# Patient Record
Sex: MALE | Race: WHITE | URBAN - METROPOLITAN AREA
[De-identification: names, ages, dates, MRNs, and addresses within clinical notes are randomized per-mention and may not be internally consistent; named-entity substitution may affect disease eponyms.]

---

## 2021-02-17 ENCOUNTER — HOSPITAL ENCOUNTER (EMERGENCY)
Age: 46
Discharge: HOME | End: 2021-02-17
Payer: COMMERCIAL

## 2021-02-17 ENCOUNTER — APPOINTMENT (OUTPATIENT)
Dept: GENERAL RADIOLOGY | Age: 46
End: 2021-02-17
Payer: COMMERCIAL

## 2021-02-17 VITALS
HEIGHT: 70 IN | SYSTOLIC BLOOD PRESSURE: 120 MMHG | HEART RATE: 76 BPM | OXYGEN SATURATION: 99 % | TEMPERATURE: 98 F | DIASTOLIC BLOOD PRESSURE: 83 MMHG | WEIGHT: 166.45 LBS | BODY MASS INDEX: 23.83 KG/M2 | RESPIRATION RATE: 17 BRPM

## 2021-02-17 DIAGNOSIS — S49.92XA INJURY OF LEFT SHOULDER, INITIAL ENCOUNTER: Primary | ICD-10-CM

## 2021-02-17 PROCEDURE — 99283 EMERGENCY DEPT VISIT LOW MDM: CPT

## 2021-02-17 PROCEDURE — 73030 X-RAY EXAM OF SHOULDER: CPT

## 2021-02-17 ASSESSMENT — PAIN SCALES - GENERAL
PAINLEVEL_OUTOF10: 2
PAINLEVEL_OUTOF10: 1

## 2021-02-17 ASSESSMENT — PAIN DESCRIPTION - PROGRESSION: CLINICAL_PROGRESSION: NOT CHANGED

## 2021-02-17 ASSESSMENT — PAIN DESCRIPTION - ORIENTATION: ORIENTATION: LEFT

## 2021-02-18 NOTE — ED PROVIDER NOTES
[address]  [address] [address]  Dept: [phone]  Loc: [address] ENCOUNTER        This patient was not seen or evaluated by the attending physician. I evaluated this patient, the attending physician was available for consultation. CHIEF COMPLAINT    Chief Complaint   Patient presents with    Shoulder Pain     left shoulder pain from falling in snow. injury occyured 2/16/2021       SHAWN Cano is a 39 y.o. male who presents with pain to the right shoulder. Onset was last night. The duration has been constant since the onset. The context is that he slipped and fell on his shoulder in the snow. The quality of the pain is sharp. The severity is 2/10. The patient has no other associated injury. REVIEW OF SYSTEMS    Musculoskeletal: see HPI, no other joint or bony injury  Cardiac: No chest pain  Pulmonary: No difficulty breathing  Skin: No lacerations or puncture wounds  Neurologic: No loss of consciousness, no head injury, no paresthesias or focal distal extremity weakness    PAST MEDICAL & SURGICAL HISTORY    History reviewed. No pertinent past medical history. History reviewed. No pertinent surgical history.     CURRENT MEDICATIONS  (may include discharge medications prescribed in the ED)  Current Outpatient Rx   Medication Sig Dispense Refill    sucralfate (CARAFATE) 1 GM/10ML suspension Take 10 mLs by mouth 4 times daily 1200 mL 0    ranitidine (ZANTAC) 300 MG tablet Take 1 tablet by mouth nightly 30 tablet 0       ALLERGIES    No Known Allergies    SOCIAL & FAMILY HISTORY    Social History     Socioeconomic History    Marital status:      Spouse name: None    Number of children: None    Years of education: None    Highest education level: None   Occupational History    None   Social Needs    Financial resource strain: None    Food insecurity     Worry: None Inability: None    Transportation needs     Medical: None     Non-medical: None   Tobacco Use    Smoking status: Former Smoker     Quit date: 12/22/2017     Years since quitting: 3.1    Smokeless tobacco: Former User   Substance and Sexual Activity    Alcohol use: No    Drug use: No    Sexual activity: Yes     Partners: Female   Lifestyle    Physical activity     Days per week: None     Minutes per session: None    Stress: None   Relationships    Social connections     Talks on phone: None     Gets together: None     Attends [employer] service: None     Active member of club or organization: None     Attends meetings of clubs or organizations: None     Relationship status: None    Intimate partner violence     Fear of current or ex partner: None     Emotionally abused: None     Physically abused: None     Forced sexual activity: None   Other Topics Concern    None   Social History Narrative    None     History reviewed. No pertinent family history. PHYSICAL EXAM    VITAL SIGNS: /83   Pulse 76   Temp 98 °F (36.7 °C) (Tympanic)   Resp 17   Ht 5' 10\" (1.778 m)   Wt 166 lb 7.2 oz (75.5 kg)   SpO2 99%   BMI 23.88 kg/m²   Constitutional:  Well nourished, no acute distress  HENT:  Atraumatic, moist mucous membranes  NECK: normal range of motion/supple, no posterior midline neck tenderness  Respiratory: Lungs clear to auscultation bilaterally, no retractions  Cardiovascular:  Regular rate, no JVD  Vascular:  radial pulse 2+ on the affected side  Musculoskeletal:  Exam of the affected shoulder reveals mild tenderness to palpation of the [facility] joint, no obvious deformity, passive and active range of motion intact.  No increased warmth or joint effusion    Integument:  Well hydrated, no skin lacerations   Neurologic:  Awake, alert, no slurred speech, axillary nerve is intact and the median/ulnar/radial nerve sensory and motor distributions are intact on the affected side      RADIOLOGY   XR SHOULDER LEFT (MIN 2 VIEWS)   Final Result   Suboptimal visualization of the humeral head due to positioning. No acute   fracture given limitation. No dislocation. Slight inferior position of the acromion relative to the clavicle appears new   from prior exam.  Correlate with presentation for acute acromioclavicular   injury. PROCEDURES  none    ED COURSE & MEDICAL DECISION MAKING   See chart for medications given during emergency department course. Differential diagnosis: includes but not limited to rotator cuff sprain/tear/rupture, clavicle fracture, humerus fracture, scapular fracture, posterior or anterior glenohumeral joint dislocation, glenohumeral subluxation, AC joint separation, brachioplexus injury or other peripheral nerve injury, cervical spine neurologic or mechanical bony injury, left apical pneumothorax, arterial injury/Ischemia, Infection, other    Patient presents with right shoulder injury. See HPI for full presentation. Physical exam as above. Tenderness over the right AC joint. Concern for [facility] separation. X-ray shows no fracture. No neurovascular deficits. Already in sling. Given orthopedic referral and discharged home in stable condition. At this time, the evidence for any other entities in the differential is insufficient to justify any further testing. This was explained to the patient. The patient was advised that persistent or worsening symptoms will require further evaluation. The patient tolerated their visit well. I saw the patient independently with physician available for consultation as needed. The patient and / or the family were informed of the results of any tests, a time was given to answer questions, a plan was proposed and they agreed with plan. FINAL IMPRESSION    1.  Injury of left shoulder, initial encounter         PLAN  Discharge with outpatient follow-up (see EMR)    (Please note that this note was completed with a voice recognition program.  Every attempt was made to edit the dictations, but inevitably there remain words that are mis-transcribed.)        Macy Smith, KAROL - CNP  02/17/21 [id number]

## 2021-02-18 NOTE — ED NOTES
D/C: Order noted for d/c. Pt confirmed d/c paperwork have correct name. Discharge and education instructions reviewed with patient. Teach-back successful. Pt verbalized understanding and signed d/c papers. Pt denied questions at this time. No acute distress noted. Patient instructed to follow-up as noted - return to emergency department if symptoms worsen. Patient verbalized understanding. Discharged per EDMD with discharge instructions. Pt discharged to private vehicle. Patient stable upon departure. Thanked patient for choosing [facility]) for care. Provider aware of patient pain at time of discharge.        Davide River RN  02/17/21 [id number]